# Patient Record
Sex: MALE | Race: BLACK OR AFRICAN AMERICAN | Employment: PART TIME | URBAN - METROPOLITAN AREA
[De-identification: names, ages, dates, MRNs, and addresses within clinical notes are randomized per-mention and may not be internally consistent; named-entity substitution may affect disease eponyms.]

---

## 2019-06-25 ENCOUNTER — HOSPITAL ENCOUNTER (EMERGENCY)
Age: 24
Discharge: LEFT BEFORE DISCHARGE | End: 2019-06-25
Attending: EMERGENCY MEDICINE
Payer: COMMERCIAL

## 2019-06-25 ENCOUNTER — APPOINTMENT (OUTPATIENT)
Dept: CT IMAGING | Age: 24
End: 2019-06-25
Payer: COMMERCIAL

## 2019-06-25 VITALS
RESPIRATION RATE: 18 BRPM | WEIGHT: 195 LBS | HEIGHT: 73 IN | HEART RATE: 84 BPM | OXYGEN SATURATION: 100 % | DIASTOLIC BLOOD PRESSURE: 78 MMHG | SYSTOLIC BLOOD PRESSURE: 132 MMHG | TEMPERATURE: 97.9 F | BODY MASS INDEX: 25.84 KG/M2

## 2019-06-25 DIAGNOSIS — R10.13 EPIGASTRIC PAIN: Primary | ICD-10-CM

## 2019-06-25 DIAGNOSIS — K38.9 APPENDICOLITH: ICD-10-CM

## 2019-06-25 LAB
ALBUMIN SERPL-MCNC: 4.7 GM/DL (ref 3.4–5)
ALP BLD-CCNC: 61 IU/L (ref 40–128)
ALT SERPL-CCNC: 15 U/L (ref 10–40)
ANION GAP SERPL CALCULATED.3IONS-SCNC: 11 MMOL/L (ref 4–16)
AST SERPL-CCNC: 27 IU/L (ref 15–37)
BACTERIA: NEGATIVE /HPF
BASOPHILS ABSOLUTE: 0.1 K/CU MM
BASOPHILS RELATIVE PERCENT: 0.7 % (ref 0–1)
BILIRUB SERPL-MCNC: 0.8 MG/DL (ref 0–1)
BILIRUBIN URINE: ABNORMAL MG/DL
BLOOD, URINE: NEGATIVE
BUN BLDV-MCNC: 15 MG/DL (ref 6–23)
CALCIUM SERPL-MCNC: 9.5 MG/DL (ref 8.3–10.6)
CHLORIDE BLD-SCNC: 104 MMOL/L (ref 99–110)
CLARITY: CLEAR
CO2: 25 MMOL/L (ref 21–32)
COLOR: ABNORMAL
CREAT SERPL-MCNC: 1.3 MG/DL (ref 0.9–1.3)
DIFFERENTIAL TYPE: ABNORMAL
EOSINOPHILS ABSOLUTE: 0.1 K/CU MM
EOSINOPHILS RELATIVE PERCENT: 1.1 % (ref 0–3)
GFR AFRICAN AMERICAN: >60 ML/MIN/1.73M2
GFR NON-AFRICAN AMERICAN: >60 ML/MIN/1.73M2
GLUCOSE BLD-MCNC: 92 MG/DL (ref 70–99)
GLUCOSE, URINE: NEGATIVE MG/DL
HCT VFR BLD CALC: 45.9 % (ref 42–52)
HEMOGLOBIN: 14.8 GM/DL (ref 13.5–18)
ICTOTEST: NEGATIVE
IMMATURE NEUTROPHIL %: 0.1 % (ref 0–0.43)
KETONES, URINE: ABNORMAL MG/DL
LEUKOCYTE ESTERASE, URINE: NEGATIVE
LIPASE: 12 IU/L (ref 13–60)
LYMPHOCYTES ABSOLUTE: 1.4 K/CU MM
LYMPHOCYTES RELATIVE PERCENT: 17.5 % (ref 24–44)
MCH RBC QN AUTO: 31.6 PG (ref 27–31)
MCHC RBC AUTO-ENTMCNC: 32.2 % (ref 32–36)
MCV RBC AUTO: 97.9 FL (ref 78–100)
MONOCYTES ABSOLUTE: 0.5 K/CU MM
MONOCYTES RELATIVE PERCENT: 6.7 % (ref 0–4)
MUCUS: ABNORMAL HPF
NITRITE URINE, QUANTITATIVE: NEGATIVE
NUCLEATED RBC %: 0 %
PDW BLD-RTO: 12.7 % (ref 11.7–14.9)
PH, URINE: 6 (ref 5–8)
PLATELET # BLD: 193 K/CU MM (ref 140–440)
PMV BLD AUTO: 10.2 FL (ref 7.5–11.1)
POTASSIUM SERPL-SCNC: 3.6 MMOL/L (ref 3.5–5.1)
PROTEIN UA: 100 MG/DL
RBC # BLD: 4.69 M/CU MM (ref 4.6–6.2)
RBC URINE: <1 /HPF (ref 0–3)
SEGMENTED NEUTROPHILS ABSOLUTE COUNT: 6 K/CU MM
SEGMENTED NEUTROPHILS RELATIVE PERCENT: 73.9 % (ref 36–66)
SODIUM BLD-SCNC: 140 MMOL/L (ref 135–145)
SPECIFIC GRAVITY UA: 1.03 (ref 1–1.03)
SPECIFIC GRAVITY UA: ABNORMAL (ref 1–1.03)
TOTAL IMMATURE NEUTOROPHIL: 0.01 K/CU MM
TOTAL NUCLEATED RBC: 0 K/CU MM
TOTAL PROTEIN: 7.9 GM/DL (ref 6.4–8.2)
TRICHOMONAS: ABNORMAL /HPF
UROBILINOGEN, URINE: NORMAL MG/DL (ref 0.2–1)
WBC # BLD: 8.1 K/CU MM (ref 4–10.5)
WBC UA: 1 /HPF (ref 0–2)

## 2019-06-25 PROCEDURE — 81001 URINALYSIS AUTO W/SCOPE: CPT

## 2019-06-25 PROCEDURE — 6360000002 HC RX W HCPCS: Performed by: EMERGENCY MEDICINE

## 2019-06-25 PROCEDURE — 83690 ASSAY OF LIPASE: CPT

## 2019-06-25 PROCEDURE — 6370000000 HC RX 637 (ALT 250 FOR IP): Performed by: EMERGENCY MEDICINE

## 2019-06-25 PROCEDURE — 85025 COMPLETE CBC W/AUTO DIFF WBC: CPT

## 2019-06-25 PROCEDURE — 74177 CT ABD & PELVIS W/CONTRAST: CPT

## 2019-06-25 PROCEDURE — 80053 COMPREHEN METABOLIC PANEL: CPT

## 2019-06-25 PROCEDURE — 6360000004 HC RX CONTRAST MEDICATION: Performed by: EMERGENCY MEDICINE

## 2019-06-25 PROCEDURE — 36415 COLL VENOUS BLD VENIPUNCTURE: CPT

## 2019-06-25 PROCEDURE — 96374 THER/PROPH/DIAG INJ IV PUSH: CPT

## 2019-06-25 PROCEDURE — 99284 EMERGENCY DEPT VISIT MOD MDM: CPT

## 2019-06-25 PROCEDURE — 99283 EMERGENCY DEPT VISIT LOW MDM: CPT | Performed by: SURGERY

## 2019-06-25 RX ORDER — MAGNESIUM HYDROXIDE/ALUMINUM HYDROXICE/SIMETHICONE 120; 1200; 1200 MG/30ML; MG/30ML; MG/30ML
30 SUSPENSION ORAL ONCE
Status: COMPLETED | OUTPATIENT
Start: 2019-06-25 | End: 2019-06-25

## 2019-06-25 RX ORDER — LIDOCAINE HYDROCHLORIDE 20 MG/ML
15 SOLUTION OROPHARYNGEAL ONCE
Status: COMPLETED | OUTPATIENT
Start: 2019-06-25 | End: 2019-06-25

## 2019-06-25 RX ORDER — MORPHINE SULFATE 4 MG/ML
2 INJECTION, SOLUTION INTRAMUSCULAR; INTRAVENOUS ONCE
Status: COMPLETED | OUTPATIENT
Start: 2019-06-25 | End: 2019-06-25

## 2019-06-25 RX ORDER — IBUPROFEN 600 MG/1
600 TABLET ORAL EVERY 6 HOURS PRN
Qty: 30 TABLET | Refills: 0 | Status: SHIPPED | OUTPATIENT
Start: 2019-06-25

## 2019-06-25 RX ORDER — SODIUM CHLORIDE 0.9 % (FLUSH) 0.9 %
10 SYRINGE (ML) INJECTION
Status: DISCONTINUED | OUTPATIENT
Start: 2019-06-25 | End: 2019-06-26 | Stop reason: HOSPADM

## 2019-06-25 RX ADMIN — HYOSCYAMINE SULFATE 125 MCG: 0.12 TABLET, ORALLY DISINTEGRATING ORAL at 17:18

## 2019-06-25 RX ADMIN — LIDOCAINE HYDROCHLORIDE 15 ML: 20 SOLUTION ORAL; TOPICAL at 17:18

## 2019-06-25 RX ADMIN — ALUMINUM HYDROXIDE, MAGNESIUM HYDROXIDE, AND SIMETHICONE 30 ML: 200; 200; 20 SUSPENSION ORAL at 17:18

## 2019-06-25 RX ADMIN — IOPAMIDOL 75 ML: 755 INJECTION, SOLUTION INTRAVENOUS at 19:51

## 2019-06-25 RX ADMIN — MORPHINE SULFATE 2 MG: 4 INJECTION INTRAVENOUS at 19:05

## 2019-06-25 ASSESSMENT — ENCOUNTER SYMPTOMS
COUGH: 0
BLOOD IN STOOL: 0
NAUSEA: 1
NAUSEA: 0
VOMITING: 0
SORE THROAT: 0
CHEST TIGHTNESS: 0
ABDOMINAL PAIN: 1
CONSTIPATION: 1
EYE REDNESS: 0
CONSTIPATION: 0
EYE DISCHARGE: 0
DIARRHEA: 0
COLOR CHANGE: 0
BACK PAIN: 0
SHORTNESS OF BREATH: 0
RHINORRHEA: 0

## 2019-06-25 ASSESSMENT — PAIN SCALES - GENERAL
PAINLEVEL_OUTOF10: 10
PAINLEVEL_OUTOF10: 7

## 2019-06-25 ASSESSMENT — PAIN DESCRIPTION - LOCATION: LOCATION: ABDOMEN

## 2019-06-25 ASSESSMENT — PAIN DESCRIPTION - PAIN TYPE: TYPE: ACUTE PAIN

## 2019-06-25 NOTE — ED TRIAGE NOTES
Pt reports abd pain that started at 1100 pt denies any Nausea or vomiting  States no relief with OTC remedies

## 2019-06-25 NOTE — ED NOTES
Pt medicated as ordered for c/o severe abd pain. Pt reports he had a \"normal \" BM yesterday. Denies any N/V/D. Pt describes abd pain as \"tight and Sharp\".      Jamal Bates RN  06/25/19 5415

## 2019-06-25 NOTE — ED NOTES
Pt tearful and in pain, requesting pain medication.  This RN will notify MD Wendy Hawley, RN  06/25/19 6773

## 2019-06-25 NOTE — ED NOTES
Pt reports pain is not any better and feels that pain is worse     Humaira Thomson RN  06/25/19 3148

## 2019-06-25 NOTE — LETTER
[facility] Emergency Department  [address]  Phone: [phone]  Fax: [phone]    No name on file. June 25, 2019     Patient: Lizeth Harvey   YOB: 1995   Date of Visit: 6/25/2019       To Whom It May Concern: It is my medical opinion that Lizeth Harvey may return to work on 6/27/19. If you have any questions or concerns, please don't hesitate to call. Sincerely,        TIM Patel RN

## 2019-06-25 NOTE — ED PROVIDER NOTES
Triage Chief Complaint:   Abdominal Pain    [place]:  Noemi Ferrera is a 21 y.o. male that presents presents with epigastric abdominal pain that started at 11 AM this morning. The pain is constant and sharp. It does not radiate. It is similar to previous pain that he has been having intermittently over the last 6 months to a year but this is the worst episode and has lasted the longest.  He tried Tums but it did not improve the symptoms. He has had a decreased appetite throughout the day and has not attempted to eat. Denies any nausea, vomiting, fevers, diarrhea or constipation. Changes of the color of his stools. No significant past medical history. ROS:   Review of Systems   Constitutional: Negative for chills and fever. HENT: Negative for congestion, rhinorrhea and sore throat. Eyes: Negative for redness and visual disturbance. Respiratory: Negative for cough and shortness of breath. Cardiovascular: Negative for chest pain and leg swelling. Gastrointestinal: Positive for abdominal pain (epigastric). Negative for blood in stool, constipation, diarrhea, nausea and vomiting. Genitourinary: Negative for dysuria and frequency. Musculoskeletal: Negative for arthralgias and back pain. Skin: Negative for rash and wound. Neurological: Negative for syncope and headaches. Psychiatric/Behavioral: Negative for hallucinations and suicidal ideas. History reviewed. No pertinent past medical history. History reviewed. No pertinent surgical history. History reviewed. No pertinent family history.   Social History     Socioeconomic History    Marital status: Single     Spouse name: Not on file    Number of children: Not on file    Years of education: Not on file    Highest education level: Not on file   Occupational History    Not on file   Social Needs    Financial resource strain: Not on file    Food insecurity:     Worry: Not on file     Inability: Not on file   Agari Metabolic Panel w/ Reflex to MG   Result Value Ref Range    Sodium 140 135 - 145 MMOL/L    Potassium 3.6 3.5 - 5.1 MMOL/L    Chloride 104 99 - 110 mMol/L    CO2 25 21 - 32 MMOL/L    BUN 15 6 - 23 MG/DL    CREATININE 1.3 0.9 - 1.3 MG/DL    Glucose 92 70 - 99 MG/DL    Calcium 9.5 8.3 - 10.6 MG/DL    Alb 4.7 3.4 - 5.0 GM/DL    Total Protein 7.9 6.4 - 8.2 GM/DL    Total Bilirubin 0.8 0.0 - 1.0 MG/DL    ALT 15 10 - 40 U/L    AST 27 15 - 37 IU/L    Alkaline Phosphatase 61 40 - 128 IU/L    GFR Non-African American >60 >60 mL/min/1.73m2    GFR African American >60 >60 mL/min/1.73m2    Anion Gap 11 4 - 16   Lipase   Result Value Ref Range    Lipase 12 (L) 13 - 60 IU/L   Urinalysis, reflex to microscopic   Result Value Ref Range    Color, UA JULIETA (A) YELLOW    Clarity, UA CLEAR CLEAR    Glucose, Urine NEGATIVE NEGATIVE MG/DL    Bilirubin Urine SMALL (A) NEGATIVE MG/DL    Ketones, Urine MODERATE (A) NEGATIVE MG/DL    Specific Gravity, UA 1.035 1.001 - 1.035    Specific Gravity, UA  1.001 - 1.035     (NOTE)  CONSIDER URINE OSMOLARITY TEST IF CLINICALLY INDICATED        Blood, Urine NEGATIVE NEGATIVE    pH, Urine 6.0 5.0 - 8.0    Protein,  (A) NEGATIVE MG/DL    Urobilinogen, Urine NORMAL 0.2 - 1.0 MG/DL    Nitrite Urine, Quantitative NEGATIVE NEGATIVE    Leukocyte Esterase, Urine NEGATIVE NEGATIVE    RBC, UA <1 0 - 3 /HPF    WBC, UA 1 0 - 2 /HPF    Bacteria, UA NEGATIVE NEGATIVE /HPF    Mucus, UA FEW (A) NEGATIVE HPF    Trichomonas, UA NONE SEEN NONE SEEN /HPF   ICTOTEST, URINE   Result Value Ref Range    Ictotest NEGATIVE       Radiographs (if obtained):  [] The following radiograph was interpreted by myself in the absence of a radiologist:  [x]Radiologist's Report Reviewed:  CT ABDOMEN PELVIS W IV CONTRAST Additional Contrast? None   Preliminary Result   Dilated appendix with appendicoliths and increased wall thickening.   No   significant surrounding inflammatory changes however appearance by CT is   abnormal and patient that they are welcome to return to the ED for further evaluation if they choose to do so. Clinical Impression:  1. Epigastric pain    2. Appendicolith          Charla Ackerman MD       Please note that portions of this note may have been complete with a voice recognition program.  Effortswere made to edit the dictations, but occasional words are mis-transcribed.           Charla Ackerman MD  06/25/19 6571

## 2019-06-26 NOTE — ED NOTES
CT ABDOMEN PELVIS W IV CONTRAST Additional Contrast? None   Status: Preliminary result   Order Providers     Marisa Vega MD          Reading Providers     Read Date Phone Pager   Indira Foster Jun 25, 2019 [phone]    Radiation Dose Estimates     No radiation information found for this patient   Narrative   EXAMINATION:   CT OF THE ABDOMEN AND PELVIS WITH CONTRAST 6/25/2019 7:52 pm       TECHNIQUE:   CT of the abdomen and pelvis was performed with the administration of   intravenous contrast. Multiplanar reformatted images are provided for review.    Dose modulation, iterative reconstruction, and/or weight based adjustment of   the mA/kV was utilized to reduce the radiation dose to as low as reasonably   achievable.       COMPARISON:   None       HISTORY:   ORDERING SYSTEM PROVIDED HISTORY: epigastric abd pain   TECHNOLOGIST PROVIDED HISTORY:   Additional Contrast?->None   Ordering Physician Provided Reason for Exam: epigastric abd pain , x 1 day   Acuity: Acute   Type of Exam: Initial   Additional signs and symptoms: 75 cc isovue 370   Relevant Medical/Surgical History: no sx       FINDINGS:   Positioning and motion cause artifact limiting evaluation.       Lower Chest: Lung bases are clear.       Organs: No liver lesion.  No gallstones.  No pancreatic lesion.  No   splenomegaly.  No adrenal lesion.  No hydronephrosis.  No renal lesion.       GI/Bowel: No bowel obstruction.  Dilated appendix measuring up to 11 mm in   diameter.  Increased wall enhancement.  Within the appendix there are at   least two appendicoliths.  No surrounding fluid collection.  No significant   adjacent inflammatory changes.       Pelvis: No free fluid.  No bladder calculus.       Peritoneum/Retroperitoneum: No aortic aneurysm.  No adenopathy.       Bones/Soft Tissues: No acute soft tissue abnormality.  L5 pars defects.           Impression   Dilated appendix with appendicoliths and increased wall thickening.  No significant surrounding inflammatory changes however appearance by CT is   abnormal and consistent with acute appendicitis.  Given absent elevated white   count this could be on the basis of early acute appendicitis.              Colette Howell RN  06/25/19 6814

## 2019-06-26 NOTE — ED NOTES
This RN in to check on pt, states that pain is tolerable as long as he does not move. Pt states he does not want to stay for admission unless absolutely necessary. Family at bedside.       Tone Walls RN  06/25/19 2127

## 2019-06-26 NOTE — ED NOTES
significant surrounding inflammatory changes however appearance by CT is   abnormal and consistent with acute appendicitis.  Given absent elevated white   count this could be on the basis of early acute appendicitis.              Mag Carranza RN  06/25/19 2128

## 2019-06-26 NOTE — ED NOTES
Pt requested pain medication from this RN and then informed ED RN Jb Levy that he wanted to go home. Dr. Kanwal Duong aware and is speaking to patient at this time.       Joana Cody RN  06/25/19 7885

## 2019-06-26 NOTE — ED NOTES
PIV removed from L arm. Pt voiced understanding of dc instructions and need to follow up if s/s should worsen.       Aracelis Ferrera RN  06/25/19 9160

## 2019-06-26 NOTE — CONSULTS
[address] [facility]    PATIENT: Ted Lord, 1995, 21 y.o., male  MRN: [id number]    Physician: Avis Rose MD    Date: 6/25/19    Reason for Evaluation:  Possible appendicitis   Chief Complaint   Patient presents with    Abdominal Pain       Requesting Physician: Denise Ontiveros MD (ED Consult)    CHIEF COMPLAINT:     Chief Complaint   Patient presents with    Abdominal Pain       History Obtained From:  Patient, EMR    HISTORY OF PRESENT ILLNESS:    Vandana Morocho is a 21 y.o. male presenting with epigastric pain. He has had similar episodes previously, but had an episode of epigastric pain today which has lasted since about 11am. The pain was constant and sharp, but has improved some with pain medication in the ED. The pain was not improved with a small BM, Tums, or resting. CT showed no upper abdominal findings, but did show an 11mm appendix containing appendicoliths with possible wall thickening but no stranding. He denies any fevers, chills, or right lower quadrant pain. He has chronic constipation. Past Medical History:    History reviewed. No pertinent past medical history. Denies any PMH    Past Surgical History:    History reviewed. No pertinent surgical history. Denies any PSH    Current Medications:   Current Facility-Administered Medications   Medication Dose Route Frequency Provider Last Rate Last Dose    sodium chloride flush 0.9 % injection 10 mL  10 mL Intravenous ONCE PRN James Higuera MD         Current Outpatient Medications   Medication Sig Dispense Refill    ibuprofen (ADVIL;MOTRIN) 600 MG tablet Take 1 tablet by mouth every 6 hours as needed for Pain 30 tablet 0    naproxen (NAPROSYN) 500 MG tablet Take 1 tablet by mouth 2 times daily 60 tablet 0    traMADol (ULTRAM) 50 MG tablet Take 1 tablet by mouth every 6 hours as needed for Pain 15 tablet 0       Allergies:  Patient has no known allergies.     Social History: Social History     Socioeconomic History    Marital status: Single     Spouse name: None    Number of children: None    Years of education: None    Highest education level: None   Occupational History    None   Social Needs    Financial resource strain: None    Food insecurity:     Worry: None     Inability: None    Transportation needs:     Medical: None     Non-medical: None   Tobacco Use    Smoking status: Never Smoker   Substance and Sexual Activity    Alcohol use: No    Drug use: None    Sexual activity: None   Lifestyle    Physical activity:     Days per week: None     Minutes per session: None    Stress: None   Relationships    Social connections:     Talks on phone: None     Gets together: None     Attends Yazdanism service: None     Active member of club or organization: None     Attends meetings of clubs or organizations: None     Relationship status: None    Intimate partner violence:     Fear of current or ex partner: None     Emotionally abused: None     Physically abused: None     Forced sexual activity: None   Other Topics Concern    None   Social History Narrative    None       Family History:   History reviewed. No pertinent family history. REVIEW OF SYSTEMS:    Review of Systems   Constitutional: Negative for chills and fever. HENT: Negative for congestion and sore throat. Eyes: Negative for discharge and redness. Respiratory: Negative for chest tightness and shortness of breath. Cardiovascular: Negative for chest pain and palpitations. Gastrointestinal: Positive for abdominal pain (epigastric), constipation and nausea. Negative for diarrhea and vomiting. Genitourinary: Negative for dysuria and flank pain. Musculoskeletal: Negative for arthralgias and myalgias. Skin: Negative for color change and rash. Neurological: Negative for dizziness and numbness. Psychiatric/Behavioral: Negative for confusion. The patient is not nervous/anxious.       I have reviewed the patient's information pertinent to this visit, including medical history, family history, social history and review of systems. PHYSICAL EXAM:    Vitals:    06/25/19 1557 06/25/19 1908 06/25/19 2103   BP: 121/69 (!) 139/90 132/78   Pulse: 71 83 84   Resp: 14 14 18   Temp: 97.9 °F (36.6 °C)     TempSrc: Oral     SpO2: 98% 99% 100%   Weight: 195 lb (88.5 kg)     Height: 6' 1\" (1.854 m)       Physical Exam   Constitutional: He is oriented to person, place, and time. He appears well-developed and well-nourished. No distress. HENT:   Head: Normocephalic and atraumatic. Eyes: Pupils are equal, round, and reactive to light. Right eye exhibits no discharge. Left eye exhibits no discharge. Neck: Neck supple. No tracheal deviation present. Cardiovascular: Normal rate and regular rhythm. Pulmonary/Chest: Effort normal. No respiratory distress. He has no wheezes. Abdominal: Soft. He exhibits no distension. There is tenderness (epigastric). There is no rebound and no guarding. No surgical scars. Nontender in right lower quadrant. Musculoskeletal: He exhibits no tenderness or deformity. Neurological: He is alert and oriented to person, place, and time. Skin: Skin is warm and dry. No rash noted. He is not diaphoretic. Psychiatric: He has a normal mood and affect.  His behavior is normal.       DATA:    Lab Results   Component Value Date    WBC 8.1 06/25/2019    HGB 14.8 06/25/2019    HCT 45.9 06/25/2019     06/25/2019     06/25/2019    K 3.6 06/25/2019     06/25/2019    CO2 25 06/25/2019    BUN 15 06/25/2019    CREATININE 1.3 06/25/2019    GLUCOSE 92 06/25/2019    CALCIUM 9.5 06/25/2019    PROT 7.9 06/25/2019    BILITOT 0.8 06/25/2019    AST 27 06/25/2019    ALT 15 06/25/2019    ALKPHOS 61 06/25/2019    LIPASE 12 (L) 06/25/2019       Imaging:   Ct Abdomen Pelvis W Iv Contrast Additional Contrast? None    Result Date: 6/25/2019  EXAMINATION: CT OF THE ABDOMEN AND PELVIS WITH to display for this patient. Visit Diagnoses:  1. Epigastric pain    2. Appendicolith      PLAN:  · CT reviewed. Discussed findings and treatmet options with the patient and Dr. Agustin Stanton at bedside. Currently he is afebrile, has a normal WBC, and is not tender in the right lower quadrant. Would not recommend appendectomy at this time. Admission for observation and serial abdominal exams was recommended, but the patient is insistent he would rather go home. Discussed with him the risks of going home and the possibility of progressing to acute appendicitis or other possible etiology of his abdominal pain. He expressed understanding of the risks, but still wished to go home. Educated him on warning signs/sx of appendicitis. He agreed to return to the ED if he is having these or worsening symptoms.    · Thank you for the consultation and the opportunity to care for [facility]    Electronically signed by Sapna Rose MD, 6/25/2019, 10:28 PM

## 2019-07-01 ENCOUNTER — OFFICE VISIT (OUTPATIENT)
Dept: FAMILY MEDICINE CLINIC | Age: 24
End: 2019-07-01
Payer: COMMERCIAL

## 2019-07-01 DIAGNOSIS — H93.8X1 CLOGGED EAR, RIGHT: Primary | ICD-10-CM

## 2019-07-01 DIAGNOSIS — R10.13 EPIGASTRIC PAIN: ICD-10-CM

## 2019-07-01 PROCEDURE — 99202 OFFICE O/P NEW SF 15 MIN: CPT | Performed by: NURSE PRACTITIONER

## 2019-07-01 PROCEDURE — G8427 DOCREV CUR MEDS BY ELIG CLIN: HCPCS | Performed by: NURSE PRACTITIONER

## 2019-07-01 PROCEDURE — G8419 CALC BMI OUT NRM PARAM NOF/U: HCPCS | Performed by: NURSE PRACTITIONER

## 2019-07-01 PROCEDURE — 1036F TOBACCO NON-USER: CPT | Performed by: NURSE PRACTITIONER

## 2019-07-01 ASSESSMENT — ENCOUNTER SYMPTOMS
NAUSEA: 0
SORE THROAT: 0
SINUS PRESSURE: 0
WHEEZING: 0
RHINORRHEA: 0
SHORTNESS OF BREATH: 0
CHEST TIGHTNESS: 0
SINUS PAIN: 0
COUGH: 0

## 2019-07-01 ASSESSMENT — PATIENT HEALTH QUESTIONNAIRE - PHQ9
1. LITTLE INTEREST OR PLEASURE IN DOING THINGS: 0
SUM OF ALL RESPONSES TO PHQ QUESTIONS 1-9: 0
SUM OF ALL RESPONSES TO PHQ QUESTIONS 1-9: 0
SUM OF ALL RESPONSES TO PHQ9 QUESTIONS 1 & 2: 0
2. FEELING DOWN, DEPRESSED OR HOPELESS: 0

## 2019-07-01 NOTE — PROGRESS NOTES
Royce Collins  1995  23 y.o. SUBJECT MARGIE:    Chief Complaint   Patient presents with   Marshall Heredia is a 21year old male who is in with one day complaint of feeling like his right ear is clogged. He states he used some peroxide last night and developed the sensation. He describes a popping sensation as well. He denies sore throat, headache, fevers, chills or sweats. He states he went to the ED about 3 days ago for complaint of mid epigastric pain. He states he had blood work and a CT scan of the abdomen. He states they found a possible appendicitis and encouraged him to be admitted but he refused. He states he just started at [employer] following graduation and cannot afford to miss work. Today, he is not complaining of abdominal pain, nausea or fever. Ear Fullness    There is pain in the right ear. This is a new problem. The current episode started yesterday. The problem occurs constantly. The problem has been waxing and waning. There has been no fever. The patient is experiencing no pain. Pertinent negatives include no coughing, ear discharge, headaches, hearing loss, rhinorrhea or sore throat. The treatment provided no relief. Current Outpatient Medications on File Prior to Visit   Medication Sig Dispense Refill    ibuprofen (ADVIL;MOTRIN) 600 MG tablet Take 1 tablet by mouth every 6 hours as needed for Pain 30 tablet 0    naproxen (NAPROSYN) 500 MG tablet Take 1 tablet by mouth 2 times daily 60 tablet 0    traMADol (ULTRAM) 50 MG tablet Take 1 tablet by mouth every 6 hours as needed for Pain 15 tablet 0     No current facility-administered medications on file prior to visit. History reviewed. No pertinent past medical history. History reviewed. No pertinent surgical history. History reviewed. No pertinent family history.   Social History     Socioeconomic History    Marital status: Single     Spouse name: Not on file    Number of children: Not on file    Years of education: Not on file    Highest education level: Not on file   Occupational History    Not on file   Social Needs    Financial resource strain: Not on file    Food insecurity:     Worry: Not on file     Inability: Not on file    Transportation needs:     Medical: Not on file     Non-medical: Not on file   Tobacco Use    Smoking status: Never Smoker    Smokeless tobacco: Never Used   Substance and Sexual Activity    Alcohol use: No    Drug use: Not on file    Sexual activity: Not on file   Lifestyle    Physical activity:     Days per week: Not on file     Minutes per session: Not on file    Stress: Not on file   Relationships    Social connections:     Talks on phone: Not on file     Gets together: Not on file     Attends Anabaptism service: Not on file     Active member of club or organization: Not on file     Attends meetings of clubs or organizations: Not on file     Relationship status: Not on file    Intimate partner violence:     Fear of current or ex partner: Not on file     Emotionally abused: Not on file     Physically abused: Not on file     Forced sexual activity: Not on file   Other Topics Concern    Not on file   Social History Narrative    Not on file       Review of Systems   Constitutional: Negative for activity change, appetite change, chills, diaphoresis, fatigue, fever and unexpected weight change. HENT: Negative for congestion, ear discharge, hearing loss, rhinorrhea, sinus pressure, sinus pain and sore throat. Respiratory: Negative for cough, chest tightness, shortness of breath and wheezing. Cardiovascular: Negative for chest pain and palpitations. Gastrointestinal: Negative for nausea. Neurological: Negative for dizziness, light-headedness and headaches. OBJECTIVE:     There were no vitals taken for this visit. Physical Exam   Constitutional: He is oriented to person, place, and time. He appears well-developed and well-nourished. No distress. HENT:   Head: Normocephalic and atraumatic. Right Ear: External ear normal.   Left Ear: External ear normal.   Nose: Nose normal.   Mouth/Throat: Oropharynx is clear and moist.   Eyes: Pupils are equal, round, and reactive to light. Conjunctivae are normal. Right eye exhibits no discharge. Left eye exhibits no discharge. No scleral icterus. Neck: Normal range of motion. Neck supple. Cardiovascular: Normal rate, regular rhythm and normal heart sounds. Pulmonary/Chest: Effort normal and breath sounds normal.   Abdominal: Soft. He exhibits no distension. There is no tenderness. There is no guarding. Musculoskeletal: Normal range of motion. Lymphadenopathy:     He has no cervical adenopathy. Neurological: He is alert and oriented to person, place, and time. Skin: Skin is warm and dry. He is not diaphoretic. Psychiatric: He has a normal mood and affect. His behavior is normal. Judgment and thought content normal.   Vitals reviewed.       Results in Past 30 Days  Result Component Current Result Ref Range Previous Result Ref Range   Alb 4.7 (6/25/2019) 3.4 - 5.0 GM/DL Not in Time Range    Alkaline Phosphatase 61 (6/25/2019) 40 - 128 IU/L Not in Time Range    ALT 15 (6/25/2019) 10 - 40 U/L Not in Time Range    AST 27 (6/25/2019) 15 - 37 IU/L Not in Time Range    BUN 15 (6/25/2019) 6 - 23 MG/DL Not in Time Range    Calcium 9.5 (6/25/2019) 8.3 - 10.6 MG/DL Not in Time Range    Chloride 104 (6/25/2019) 99 - 110 mMol/L Not in Time Range    CO2 25 (6/25/2019) 21 - 32 MMOL/L Not in Time Range    CREATININE 1.3 (6/25/2019) 0.9 - 1.3 MG/DL Not in Time Range    GFR  >60 (6/25/2019) >60 mL/min/1.73m2 Not in Time Range    GFR Non- >60 (6/25/2019) >60 mL/min/1.73m2 Not in Time Range    Glucose 92 (6/25/2019) 70 - 99 MG/DL Not in Time Range    Potassium 3.6 (6/25/2019) 3.5 - 5.1 MMOL/L Not in Time Range    Sodium 140 (6/25/2019) 135 - 145 MMOL/L Not in Time Range    Total Bilirubin 0.8